# Patient Record
Sex: MALE | Race: WHITE
[De-identification: names, ages, dates, MRNs, and addresses within clinical notes are randomized per-mention and may not be internally consistent; named-entity substitution may affect disease eponyms.]

---

## 2022-05-09 ENCOUNTER — HOSPITAL ENCOUNTER (EMERGENCY)
Dept: HOSPITAL 26 - MED | Age: 32
Discharge: HOME | End: 2022-05-09
Payer: COMMERCIAL

## 2022-05-09 VITALS — BODY MASS INDEX: 32.78 KG/M2 | WEIGHT: 204 LBS | HEIGHT: 66 IN

## 2022-05-09 VITALS — DIASTOLIC BLOOD PRESSURE: 71 MMHG | SYSTOLIC BLOOD PRESSURE: 108 MMHG

## 2022-05-09 VITALS — SYSTOLIC BLOOD PRESSURE: 108 MMHG | DIASTOLIC BLOOD PRESSURE: 71 MMHG
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
## 2022-05-09 DIAGNOSIS — Z79.899: ICD-10-CM
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
## 2022-05-09 DIAGNOSIS — F14.90: ICD-10-CM

## 2022-05-09 DIAGNOSIS — S39.012A: Primary | ICD-10-CM

## 2022-05-09 NOTE — NUR
Patient discharged with v/s stable. Written and verbal after care instructions 
ABOUT LUMBAR STRAIN given and explained. 

Patient alert, oriented and verbalized understanding of instructions. 
Ambulatory with steady gait. All questions addressed prior to discharge. ID 
band removed. Patient advised to follow up with PMD. Rx of IBUPROFEN AND 
ROBAXIN given. Patient educated on indication of medication including possible 
reaction and side effects. Opportunity to ask questions provided and answered.